# Patient Record
Sex: FEMALE | Race: WHITE | NOT HISPANIC OR LATINO | URBAN - METROPOLITAN AREA
[De-identification: names, ages, dates, MRNs, and addresses within clinical notes are randomized per-mention and may not be internally consistent; named-entity substitution may affect disease eponyms.]

---

## 2017-04-19 ENCOUNTER — APPOINTMENT (RX ONLY)
Dept: URBAN - METROPOLITAN AREA CLINIC 41 | Facility: CLINIC | Age: 47
Setting detail: DERMATOLOGY
End: 2017-04-19

## 2017-04-19 DIAGNOSIS — B07.8 OTHER VIRAL WARTS: ICD-10-CM

## 2017-04-19 DIAGNOSIS — L73.2 HIDRADENITIS SUPPURATIVA: ICD-10-CM

## 2017-04-19 PROBLEM — L70.0 ACNE VULGARIS: Status: ACTIVE | Noted: 2017-04-19

## 2017-04-19 PROBLEM — D23.72 OTHER BENIGN NEOPLASM OF SKIN OF LEFT LOWER LIMB, INCLUDING HIP: Status: ACTIVE | Noted: 2017-04-19

## 2017-04-19 PROCEDURE — ? PRESCRIPTION

## 2017-04-19 PROCEDURE — ? COUNSELING

## 2017-04-19 PROCEDURE — ? INTRALESIONAL KENALOG

## 2017-04-19 PROCEDURE — 11900 INJECT SKIN LESIONS </W 7: CPT

## 2017-04-19 PROCEDURE — 99202 OFFICE O/P NEW SF 15 MIN: CPT | Mod: 25

## 2017-04-19 PROCEDURE — ? TREATMENT REGIMEN

## 2017-04-19 RX ORDER — CIMETIDINE 200 MG/1
TABLET, FILM COATED ORAL TID
Qty: 60 | Refills: 3 | Status: ERX | COMMUNITY
Start: 2017-04-19

## 2017-04-19 RX ADMIN — CIMETIDINE: 200 TABLET, FILM COATED ORAL at 21:59

## 2017-04-19 ASSESSMENT — LOCATION SIMPLE DESCRIPTION DERM
LOCATION SIMPLE: ABDOMEN
LOCATION SIMPLE: LEFT AXILLARY VAULT
LOCATION SIMPLE: RIGHT AXILLARY VAULT

## 2017-04-19 ASSESSMENT — LOCATION DETAILED DESCRIPTION DERM
LOCATION DETAILED: RIGHT RIB CAGE
LOCATION DETAILED: LEFT AXILLARY VAULT
LOCATION DETAILED: LEFT RIB CAGE
LOCATION DETAILED: RIGHT AXILLARY VAULT
LOCATION DETAILED: SUBXIPHOID

## 2017-04-19 ASSESSMENT — LOCATION ZONE DERM
LOCATION ZONE: TRUNK
LOCATION ZONE: AXILLAE

## 2017-10-09 ENCOUNTER — RX ONLY (OUTPATIENT)
Age: 47
Setting detail: RX ONLY
End: 2017-10-09

## 2017-10-09 RX ORDER — CEPHALEXIN 500 MG/1
1 CAPSULE ORAL TID
Qty: 30 | Refills: 0 | Status: ERX | COMMUNITY
Start: 2017-10-09

## 2025-06-12 NOTE — PROCEDURE: INTRALESIONAL KENALOG
[Very Good] : ~his/her~ current health as very good
[No falls in past year] : Patient reported no falls in the past year
[Never] : Never
[YES] : Yes
[Are there any children in your household?] : There are children in the household.
[Have you attended a firearm safety workshop or class?] : A firearm safety workshop or class has been attended.
[Fully functional (bathing, dressing, toileting, transferring, walking, feeding)] : Fully functional (bathing, dressing, toileting, transferring, walking, feeding)
[Fully functional (using the telephone, shopping, preparing meals, housekeeping, doing laundry, using] : Fully functional and needs no help or supervision to perform IADLs (using the telephone, shopping, preparing meals, housekeeping, doing laundry, using transportation, managing medications and managing finances)
X Size Of Lesion In Cm (Optional): 0
[Reports normal functional visual acuity (ie: able to read med bottle)] : Reports normal functional visual acuity
Total Volume (Ccs): 0.6
[de-identified] : run, lift 3-5x/wk
Include Z78.9 (Other Specified Conditions Influencing Health Status) As An Associated Diagnosis?: No
[de-identified] : mediterranean
Administered By (Optional): Dr. Carlos DO
[Are there any unlocked firearms stored in your household?] : No unlocked firearms in the household.
Medical Necessity Clause: This procedure was medically necessary because the lesions that were treated were:
[Are there any firearms stored in your household that are loaded?] : No firearms are stored in the household loaded.
Detail Level: Detailed
[Has anyone in the household been feeling low/depressed/been struggling?] : No one in the household has been feeling low/depressed/been struggling.
Consent: The risks of atrophy were reviewed with the patient.
[Language] : denies difficulty with language
Treatment Number (Optional): 1
[Behavior] : denies difficulty with behavior
Concentration Of Kenalog Solution Injected (Mg/Ml): 20.0
[Learning/Retaining New Information] : denies difficulty learning/retaining new information
Expiration Date For Kenalog (Optional): 08/2018
[Handling Complex Tasks] : denies difficulty handling complex tasks
Lot # For Kenalog (Optional): [id number]
[Reasoning] : denies difficulty with reasoning
Kenalog Preparation: Kenalog
[Spatial Ability and Orientation] : denies difficulty with spatial ability and orientation
[Reports changes in hearing] : Reports no changes in hearing
[Reports changes in vision] : Reports no changes in vision
[Reports changes in dental health] : Reports no changes in dental health